# Patient Record
Sex: FEMALE | URBAN - METROPOLITAN AREA
[De-identification: names, ages, dates, MRNs, and addresses within clinical notes are randomized per-mention and may not be internally consistent; named-entity substitution may affect disease eponyms.]

---
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
## 2024-02-27 NOTE — PROCEDURE: TREATMENT REGIMEN
English
Detail Level: Detailed
Continue Regimen: Claravis 40mg take 1 by mouth twice a day
Plan: Do labs fasting on or after 1/16/21